# Patient Record
Sex: MALE | Race: WHITE | NOT HISPANIC OR LATINO | Employment: FULL TIME | URBAN - METROPOLITAN AREA
[De-identification: names, ages, dates, MRNs, and addresses within clinical notes are randomized per-mention and may not be internally consistent; named-entity substitution may affect disease eponyms.]

---

## 2021-04-05 ENCOUNTER — OFFICE VISIT (OUTPATIENT)
Dept: URGENT CARE | Facility: CLINIC | Age: 46
End: 2021-04-05
Payer: COMMERCIAL

## 2021-04-05 ENCOUNTER — APPOINTMENT (OUTPATIENT)
Dept: RADIOLOGY | Facility: IMAGING CENTER | Age: 46
End: 2021-04-05
Attending: PHYSICIAN ASSISTANT
Payer: COMMERCIAL

## 2021-04-05 VITALS
WEIGHT: 145 LBS | SYSTOLIC BLOOD PRESSURE: 122 MMHG | HEIGHT: 69 IN | RESPIRATION RATE: 16 BRPM | DIASTOLIC BLOOD PRESSURE: 88 MMHG | OXYGEN SATURATION: 98 % | HEART RATE: 73 BPM | BODY MASS INDEX: 21.48 KG/M2 | TEMPERATURE: 98.7 F

## 2021-04-05 DIAGNOSIS — S69.91XA INJURY OF FINGER OF RIGHT HAND, INITIAL ENCOUNTER: ICD-10-CM

## 2021-04-05 DIAGNOSIS — S61.219A LACERATION OF FINGER WITH INFECTION, INITIAL ENCOUNTER: ICD-10-CM

## 2021-04-05 DIAGNOSIS — L08.9 LACERATION OF FINGER WITH INFECTION, INITIAL ENCOUNTER: ICD-10-CM

## 2021-04-05 PROCEDURE — 99203 OFFICE O/P NEW LOW 30 MIN: CPT | Performed by: PHYSICIAN ASSISTANT

## 2021-04-05 RX ORDER — CEPHALEXIN 500 MG/1
500 TABLET ORAL 4 TIMES DAILY
Qty: 28 TABLET | Refills: 0 | Status: SHIPPED | OUTPATIENT
Start: 2021-04-05 | End: 2021-04-12

## 2021-04-05 ASSESSMENT — ENCOUNTER SYMPTOMS
FEVER: 0
HEADACHES: 0
SHORTNESS OF BREATH: 0
EYE DISCHARGE: 0
NAUSEA: 0
EYE REDNESS: 0
COUGH: 0
SORE THROAT: 0
VOMITING: 0

## 2021-04-05 NOTE — LETTER
April 5, 2021         Patient: Adam Wilson   YOB: 1975   Date of Visit: 4/5/2021           To Whom it May Concern:    Adam Wilson was seen in my clinic on 4/5/2021. Please excuse him from work today.    If you have any questions or concerns, please don't hesitate to call.        Sincerely,           Inga Mera P.A.-C.  Electronically Signed

## 2021-04-05 NOTE — PROGRESS NOTES
Subjective:      Adam Wilson is a 45 y.o. male who presents with Finger Injury (x4 days,  index/pinky fingers on (R) hand)        HPI  This is a new problem.   The patient presents to clinic complaining of an injury to his right hand, specifically the right thumb and the right fifth digit.  The patient states the injury occurred x4 days ago while he was working on his car.  The patient states he was holding a power tool in his right hand.  The patient states the wind blew the car door open, which caused the power tool to slip.  The patient states the grinding wire wheel cut his right thumb and right fifth digit.  The patient states he was wearing gloves at the time of the accident.  The patient states he cleaned the wounds following the injury and applied dressings.  The patient states he has been changing the dressings daily.  The patient states he recently applied liquid Band-Aid to the injury to his right thumb, and states his thumb injury is healing well. The patient is concerned the injury to the right fifth digit has become infected.  The patient reports associated redness, as well as a pus-like discharge/drainage.  The patient reports slight decreased range of motion of the right fifth digit secondary to swelling and stiffness.  He reports no numbness, tingling, weakness.  No fever.  The patient is taken ibuprofen for his current symptoms.  The patient states his tetanus vaccine is up-to-date, stating he believes he received a Tdap in 2017.  The patient is right-handed.    PMH:  has no past medical history on file.  MEDS: No current outpatient medications on file.  ALLERGIES: No Known Allergies  SURGHX: No past surgical history on file.  SOCHX:  reports that he has never smoked. He has never used smokeless tobacco. He reports current alcohol use. He reports that he does not use drugs.  FH: Family history was reviewed, no pertinent findings to report    Review of Systems   Constitutional:  "Negative for fever.   HENT: Negative for congestion, ear pain and sore throat.    Eyes: Negative for discharge and redness.   Respiratory: Negative for cough and shortness of breath.    Cardiovascular: Negative for chest pain and leg swelling.   Gastrointestinal: Negative for nausea and vomiting.   Musculoskeletal: Negative for joint pain.   Skin: Negative for rash.        + abrasion/laceration to right thumb and right 5th digit   Neurological: Negative for headaches.          Objective:     /88   Pulse 73   Temp 37.1 °C (98.7 °F) (Temporal)   Resp 16   Ht 1.753 m (5' 9\")   Wt 65.8 kg (145 lb)   SpO2 98%   BMI 21.41 kg/m²      Physical Exam  Constitutional:       General: He is not in acute distress.     Appearance: Normal appearance. He is well-developed. He is not ill-appearing.   HENT:      Head: Normocephalic and atraumatic.      Right Ear: External ear normal.      Left Ear: External ear normal.   Eyes:      Extraocular Movements: Extraocular movements intact.      Conjunctiva/sclera: Conjunctivae normal.   Cardiovascular:      Rate and Rhythm: Normal rate.   Pulmonary:      Effort: Pulmonary effort is normal.   Musculoskeletal:      Cervical back: Normal range of motion and neck supple.      Comments:   Right Hand:  Thumb-  Superficial abrasion/laceration to the distal aspect of the right thumb there is scabbing and liquid Band-Aid in place.  No tenderness to palpation.  No swelling.  No surrounding erythema.  No increased warmth.  No discharge/drainage.  No bony tenderness.  ROM intact -the patient demonstrates full active range of motion of the right thumb  Neurovascular intact distally  Capillary refill less than 2 seconds  Strength 5/5 - flexion/extension of the left thumb against resistance  5th Digit-  Superficial abrasion/laceration to the distal aspect of the right third digit overlying the DIP joint with scabbing in place and associated tenderness to palpation and trace surrounding " erythema.  Slight tenderness to the DIP joint of the right fifth digit.  No swelling.  No increased warmth.  No active discharge/drainage.   ROM intact - the patient demonstrates full active range of motion of the right fifth digit  Neurovascular intact distally  Capillary refill less than 2 seconds  Strength 5/5 -flexion/extension against resistance of the right fifth digit   Skin:     General: Skin is warm and dry.   Neurological:      Mental Status: He is alert and oriented to person, place, and time.            Progress:  Right Finger XR:   XRs reviewed by me.     COMPARISON: None     FINDINGS:  Bone mineralization is normal.  There is no evidence of fracture or dislocation.  There is no evidence of arthropathy.  Soft tissue swelling and laceration appear to be present at the level of the proximal end of the distal phalanx.  No metallic foreign   body is appreciated.     IMPRESSION:  No evidence of fracture or dislocation.    Wound Care:  Cleansed with the patient's abrasion/laceration to the right 5th digit with diluted Hibiclens.  Irrigated the wound with saline bullets.  Applied Polysporin and a nonstick dressing with Coban to the wound.  Educated the patient on proper wound care.  Advised patient to monitor for worsening signs of infection.      Assessment/Plan:          1. Injury of finger of right hand, initial encounter  - DX-FINGER(S) 2+ RIGHT; Future    2. Laceration of finger with infection, initial encounter  - Cephalexin 500 MG Tab; Take 1 tablet by mouth 4 times a day for 7 days.  Dispense: 28 tablet; Refill: 0    Differential diagnoses, supportive care, and indications for immediate follow-up discussed with patient.   Instructed to return to clinic or nearest emergency department for any change in condition, further concerns, or worsening of symptoms.    OTC Tylenol or Motrin for fever/discomfort.  Keep laceration clean and dry  Apply Polysporin/Neosporin to the laceration as needed  Cover  laceration while at work  Allow wound to be open to the air for at least a portion of the day  Avoid soaking the wound  Monitor for signs of infection  Follow-up with PCP  Return to clinic or go to the ED if symptoms worsen or fail to improve, or if patient should develop worsening/increasing/persistent pain/tenderness to the injured area, swelling, bruising, redness or warmth to the injured area, discharge/drainage from the wound, decreased range of motion, fever/chills, secondary signs of infection, and/or any concerning symptoms.    Discussed plan with the patient, and he agrees to the above.    I personally reviewed prior external notes and test results pertinent to today's visit.  I have independently reviewed and interpreted all diagnostics ordered during this urgent care visit.     Time spent evaluating this patient was at least 30 minutes and includes preparing for visit, obtaining history, exam and evaluation, ordering labs/tests/procedures/medications, independent interpretation, and counseling/education.    Please note that this dictation was created using voice recognition software. I have made every reasonable attempt to correct obvious errors, but I expect that there may be errors of grammar and possibly content that I did not discover before finalizing the note.     This note was electronically signed by Inga Mera PA-C

## 2022-01-25 ENCOUNTER — HOSPITAL ENCOUNTER (OUTPATIENT)
Dept: LAB | Facility: MEDICAL CENTER | Age: 47
End: 2022-01-25
Attending: OBSTETRICS & GYNECOLOGY
Payer: COMMERCIAL

## 2022-01-25 LAB
HBV SURFACE AB SERPL IA-ACNC: <3.5 MIU/ML (ref 0–10)
HBV SURFACE AG SER QL: NORMAL
HCV AB SER QL: NORMAL
HIV 1+2 AB+HIV1 P24 AG SERPL QL IA: NORMAL
TREPONEMA PALLIDUM IGG+IGM AB [PRESENCE] IN SERUM OR PLASMA BY IMMUNOASSAY: NORMAL

## 2022-01-25 PROCEDURE — 86803 HEPATITIS C AB TEST: CPT

## 2022-01-25 PROCEDURE — 87340 HEPATITIS B SURFACE AG IA: CPT

## 2022-01-25 PROCEDURE — 36415 COLL VENOUS BLD VENIPUNCTURE: CPT

## 2022-01-25 PROCEDURE — 86706 HEP B SURFACE ANTIBODY: CPT

## 2022-01-25 PROCEDURE — 87491 CHLMYD TRACH DNA AMP PROBE: CPT

## 2022-01-25 PROCEDURE — 87389 HIV-1 AG W/HIV-1&-2 AB AG IA: CPT

## 2022-01-25 PROCEDURE — 87591 N.GONORRHOEAE DNA AMP PROB: CPT

## 2022-01-25 PROCEDURE — 86780 TREPONEMA PALLIDUM: CPT

## 2022-01-26 LAB
C TRACH DNA SPEC QL NAA+PROBE: NEGATIVE
N GONORRHOEA DNA SPEC QL NAA+PROBE: NEGATIVE
SPECIMEN SOURCE: NORMAL

## 2022-03-13 ENCOUNTER — OFFICE VISIT (OUTPATIENT)
Dept: URGENT CARE | Facility: CLINIC | Age: 47
End: 2022-03-13
Payer: COMMERCIAL

## 2022-03-13 VITALS
TEMPERATURE: 98.6 F | DIASTOLIC BLOOD PRESSURE: 68 MMHG | SYSTOLIC BLOOD PRESSURE: 120 MMHG | OXYGEN SATURATION: 95 % | RESPIRATION RATE: 14 BRPM | HEIGHT: 69 IN | WEIGHT: 156.8 LBS | HEART RATE: 86 BPM | BODY MASS INDEX: 23.22 KG/M2

## 2022-03-13 DIAGNOSIS — A60.00 GENITAL HERPES SIMPLEX, UNSPECIFIED SITE: ICD-10-CM

## 2022-03-13 PROCEDURE — 99204 OFFICE O/P NEW MOD 45 MIN: CPT | Performed by: PHYSICIAN ASSISTANT

## 2022-03-13 RX ORDER — VALACYCLOVIR HYDROCHLORIDE 1 G/1
1000 TABLET, FILM COATED ORAL 2 TIMES DAILY
Qty: 20 TABLET | Refills: 0 | Status: SHIPPED | OUTPATIENT
Start: 2022-03-13 | End: 2022-03-23

## 2022-03-13 ASSESSMENT — ENCOUNTER SYMPTOMS
CHILLS: 0
TINGLING: 0
MYALGIAS: 0
NAUSEA: 0
VOMITING: 0
FOCAL WEAKNESS: 0
SENSORY CHANGE: 0
FEVER: 0

## 2022-03-14 NOTE — PROGRESS NOTES
"Subjective     Adam Wilson is a 46 y.o. male who presents with Other (Treatment for herpes )            The patient is here requesting treatment for herpes. The patient reports he has a herpes outbreak on his scrotal area and anal region that started about 1.5 weeks ago. He has history of herpes. His last outbreak was in 2018. He typically treats his outbreaks at home but he states the rash has become very uncomfortable. He denies fever or chills. No nausea, vomiting or body aches. He denies drainage from the sores.      No past medical history on file.    No past surgical history on file.    No family history on file.    No Known Allergies      Medications, Allergies, and current problem list reviewed today in Epic      Review of Systems   Constitutional: Negative for chills, fever and malaise/fatigue.   Gastrointestinal: Negative for nausea and vomiting.   Musculoskeletal: Negative for myalgias.   Skin: Positive for rash.   Neurological: Negative for tingling, sensory change and focal weakness.         All other systems reviewed and are negative.         Objective     /68 (BP Location: Left arm, Patient Position: Sitting, BP Cuff Size: Adult)   Pulse 86   Temp 37 °C (98.6 °F)   Resp 14   Ht 1.753 m (5' 9\")   Wt 71.1 kg (156 lb 12.8 oz)   SpO2 95%   BMI 23.16 kg/m²      Physical Exam  Constitutional:       General: He is not in acute distress.     Appearance: He is not ill-appearing.   HENT:      Head: Normocephalic and atraumatic.   Eyes:      Conjunctiva/sclera: Conjunctivae normal.   Cardiovascular:      Rate and Rhythm: Normal rate and regular rhythm.   Pulmonary:      Effort: Pulmonary effort is normal. No respiratory distress.   Genitourinary:     Comments: deferred  Skin:     General: Skin is warm and dry.   Neurological:      General: No focal deficit present.      Mental Status: He is alert and oriented to person, place, and time.   Psychiatric:         Mood and Affect: Mood " normal.         Behavior: Behavior normal.         Thought Content: Thought content normal.         Judgment: Judgment normal.                             Assessment & Plan        1. Genital herpes simplex, unspecified site    Patient refused to let me examine his rash.  He has a history of recurrent outbreaks and states his symptoms are exactly the same.   Discussed that he needs to monitor for secondary infection.   - valacyclovir (VALTREX) 1 GM Tab; Take 1 Tablet by mouth 2 times a day for 10 days.  Dispense: 20 Tablet; Refill: 0         Differential diagnoses, Supportive care, and indications for immediate follow-up discussed with patient.   Pathogenesis of diagnosis discussed including typical length and natural progression.   Instructed to return to clinic or nearest emergency department for any change in condition, further concerns, or worsening of symptoms.    The patient demonstrated a good understanding and agreed with the treatment plan.    Ronna Lassiter P.A.-C.

## 2022-03-17 ENCOUNTER — OFFICE VISIT (OUTPATIENT)
Dept: URGENT CARE | Facility: CLINIC | Age: 47
End: 2022-03-17
Payer: COMMERCIAL

## 2022-03-17 ENCOUNTER — TELEPHONE (OUTPATIENT)
Dept: URGENT CARE | Facility: CLINIC | Age: 47
End: 2022-03-17

## 2022-03-17 VITALS
BODY MASS INDEX: 23.25 KG/M2 | TEMPERATURE: 98.8 F | WEIGHT: 157 LBS | HEIGHT: 69 IN | OXYGEN SATURATION: 98 % | RESPIRATION RATE: 12 BRPM | SYSTOLIC BLOOD PRESSURE: 138 MMHG | DIASTOLIC BLOOD PRESSURE: 84 MMHG | HEART RATE: 89 BPM

## 2022-03-17 DIAGNOSIS — B37.49 CANDIDIASIS OF PERINEUM: ICD-10-CM

## 2022-03-17 DIAGNOSIS — N48.1 BALANITIS: ICD-10-CM

## 2022-03-17 DIAGNOSIS — L30.9 ECZEMA OF BOTH HANDS: ICD-10-CM

## 2022-03-17 PROCEDURE — 99213 OFFICE O/P EST LOW 20 MIN: CPT | Performed by: NURSE PRACTITIONER

## 2022-03-17 RX ORDER — TRIAMCINOLONE ACETONIDE 1 MG/G
1 CREAM TOPICAL 2 TIMES DAILY
Qty: 15 G | Refills: 0 | Status: SHIPPED | OUTPATIENT
Start: 2022-03-17 | End: 2022-03-31

## 2022-03-17 RX ORDER — NYSTATIN 100000 [USP'U]/G
1 POWDER TOPICAL 3 TIMES DAILY
Qty: 30 G | Refills: 0 | Status: SHIPPED | OUTPATIENT
Start: 2022-03-17

## 2022-03-17 ASSESSMENT — ENCOUNTER SYMPTOMS
CHILLS: 0
FEVER: 0

## 2022-03-17 NOTE — LETTER
March 17, 2022         Patient: Adam Wilson   YOB: 1975   Date of Visit: 3/17/2022           To Whom it May Concern:    Adam Wilson was seen in my clinic on 3/17/2022. He may return to work on 3/23/2022.    If you have any questions or concerns, please don't hesitate to call.        Sincerely,           LULI Ervin.  Electronically Signed

## 2022-03-18 NOTE — PROGRESS NOTES
Subjective:     Adam Wilson is a 46 y.o. male who presents for Herpes Zoster (Started in January/has spread/painful/has discharge/scabbing)      Presents for worsening rash to pubis, genitalia, and rectal area. Believes symptoms started from a HSV outbreak in January. Started by the rectum. Reports discharge from lesions was clear fluid, now orange. Pain with walking and sitting due to swelling. No itch to groin area. Hx of HSV, stating this is not the first occurrence. Is in a monogamous relationship, no concerns for a new STI.  Is in need of a PCP.  Missed work today, and needs a work note.    Also has a hx of eczema, rash to dorsal hands is itchy.     Rash  This is a recurrent problem. The current episode started more than 1 month ago. The problem has been gradually worsening since onset. The affected locations include the genitalia and groin. The rash is characterized by burning, pain, redness, swelling and draining. He was exposed to nothing. Pertinent negatives include no fever. His past medical history is significant for eczema.       History reviewed. No pertinent past medical history.    History reviewed. No pertinent surgical history.    Social History     Socioeconomic History   • Marital status:      Spouse name: Not on file   • Number of children: Not on file   • Years of education: Not on file   • Highest education level: Not on file   Occupational History   • Not on file   Tobacco Use   • Smoking status: Never Smoker   • Smokeless tobacco: Never Used   Vaping Use   • Vaping Use: Never used   Substance and Sexual Activity   • Alcohol use: Yes     Comment: occ   • Drug use: Never   • Sexual activity: Not on file   Other Topics Concern   • Not on file   Social History Narrative   • Not on file     Social Determinants of Health     Financial Resource Strain: Not on file   Food Insecurity: Not on file   Transportation Needs: Not on file   Physical Activity: Not on file   Stress: Not  "on file   Social Connections: Not on file   Intimate Partner Violence: Not on file   Housing Stability: Not on file        History reviewed. No pertinent family history.     No Known Allergies    Review of Systems   Constitutional: Negative for chills and fever.   Skin: Positive for itching and rash.   All other systems reviewed and are negative.       Objective:   /84 (BP Location: Right arm, Patient Position: Standing, BP Cuff Size: Adult)   Pulse 89   Temp 37.1 °C (98.8 °F) (Temporal)   Resp 12   Ht 1.753 m (5' 9\")   Wt 71.2 kg (157 lb)   SpO2 98%   BMI 23.18 kg/m²     Physical Exam  Genitourinary:     Penis: Tenderness and swelling present.       Comments: Hyperpigmented rash is moist with bordering satellite lesions, and excoriations to gluteal folds, mid buttocks, penis, and scrotum. Scrotal and glans swelling. No skin sloughing, Negative Nikolsky's sign. No vesicular lesions or pustules.   Skin:     General: Skin is warm and dry.      Findings: Rash present. Rash is papular.      Comments: Dry papular rash to bilateral dorsal hands.          Assessment/Plan:   1. Balanitis  - Referral to establish with [facility] PCP  - nystatin (MYCOSTATIN) powder; Apply 1 g topically 3 times a day.  Dispense: 30 g; Refill: 0  - Referral to Dermatology  - Clotrimazole 1 % Ointment; Apply 1 Application topically 2 times a day for 14 days.  Dispense: 56.7 g; Refill: 0    2. Candidiasis of perineum  - Referral to establish with [facility] PCP  - nystatin (MYCOSTATIN) powder; Apply 1 g topically 3 times a day.  Dispense: 30 g; Refill: 0  - Referral to Dermatology  - Clotrimazole 1 % Ointment; Apply 1 Application topically 2 times a day for 14 days.  Dispense: 56.7 g; Refill: 0    3. Eczema of both hands  - Referral to establish with [facility] PCP  - Referral to Dermatology  - triamcinolone acetonide (KENALOG) 0.1 % Cream; Apply 1 Application topically 2 times a day for 14 days.  Dispense: 15 g; Refill: 0  -Avoid scratching to " cause any skin breakdown.  -Clean area with gentle soap and water, and throughly dry the area. Allow air circulation by keeping area open to air when possible, and wear breathable clothing (cotton).      Follow up with PCP. Follow up for worsening symptoms, signs of infection, persistent or worsening pain, or any other concerns. Follow up emergently for fever, skin sloughing, or increased testicular or penile swelling.    Can use nystatin powder for skin folds. Area is moist with satellite lesions, consistent with a candidal infection. Discussed candidal vs tinea infection, treatment covers both. Differential to include balanitis that has spread to surrounding tissues. Afebrile, stable vitals. No indication of secondary bacterial skin infection at this time, advised follow up for persistent or worsening symtpms. Work note provided for the next 3 days off of work to allow for healing, after Saturday he will be off until Wednesday. Reviewed notes from previous visit.     Differential diagnosis, natural history, supportive care, and indications for immediate follow-up discussed.

## 2022-03-18 NOTE — TELEPHONE ENCOUNTER
Called and LVM for patient to let him know that provider e-scribed over three medications to the [facility] per his request.   Asked patient to call back if he had any questions, attempted to contact patient thru wife's phone but VM box not set up.

## 2022-03-18 NOTE — PATIENT INSTRUCTIONS
-Avoid scratching to cause any skin breakdown.  -Clean area with gentle soap and water, and throughly dry the area. Allow air circulation by keeping area open to air when possible, and wear breathable clothing (cotton).      Follow up with PCP. Follow up for worsening symptoms, signs of infection, persistent or worsening pain, or any other concerns. Follow up emergently for fever, skin sloughing, or increased testicular or penile swelling.      Skin Yeast Infection    A skin yeast infection is a condition in which there is an overgrowth of yeast (candida) that normally lives on the skin. This condition usually occurs in areas of the skin that are constantly warm and moist, such as the armpits or the groin.  What are the causes?  This condition is caused by a change in the normal balance of the yeast and bacteria that live on the skin.  What increases the risk?  You are more likely to develop this condition if you:  · Are obese.  · Are pregnant.  · Take birth control pills.  · Have diabetes.  · Take antibiotic medicines.  · Take steroid medicines.  · Are malnourished.  · Have a weak body defense system (immune system).  · Are 65 years of age or older.  · Wear tight clothing.  What are the signs or symptoms?  The most common symptom of this condition is itchiness in the affected area. Other symptoms include:  · Red, swollen area of the skin.  · Bumps on the skin.  How is this diagnosed?  · This condition is diagnosed with a medical history and physical exam.  · Your health care provider may check for yeast by taking light scrapings of the skin to be viewed under a microscope.  How is this treated?  This condition is treated with medicine. Medicines may be prescribed or be available over the counter. The medicines may be:  · Taken by mouth (orally).  · Applied as a cream or powder to your skin.  Follow these instructions at home:    · Take or apply over-the-counter and prescription medicines only as told by your health  care provider.  · Maintain a healthy weight. If you need help losing weight, talk with your health care provider.  · Keep your skin clean and dry.  · If you have diabetes, keep your blood sugar under control.  · Keep all follow-up visits as told by your health care provider. This is important.  Contact a health care provider if:  · Your symptoms go away and then return.  · Your symptoms do not get better with treatment.  · Your symptoms get worse.  · Your rash spreads.  · You have a fever or chills.  · You have new symptoms.  · You have new warmth or redness of your skin.  Summary  · A skin yeast infection is a condition in which there is an overgrowth of yeast (candida) that normally lives on the skin. This condition is caused by a change in the normal balance of the yeast and bacteria that live on the skin.  · Take or apply over-the-counter and prescription medicines only as told by your health care provider.  · Keep your skin clean and dry.  · Contact a health care provider if your symptoms do not get better with treatment.  This information is not intended to replace advice given to you by your health care provider. Make sure you discuss any questions you have with your health care provider.  Document Released: 09/04/2012 Document Revised: 05/07/2019 Document Reviewed: 05/07/2019  [employer] Patient Education © 2020 [employer]      Balanitis    Balanitis is swelling and irritation (inflammation) of the head of the penis (glans penis). The condition may also cause inflammation of the skin around the glans penis (foreskin) in men who have not been circumcised. It may develop because of an infection or another medical condition.  Balanitis occurs most often among men who have not had their foreskin removed (uncircumcised men). Balanitis sometimes causes scarring of the penis or foreskin, which can require surgery. Untreated balanitis can increase the risk of penile cancer.  What are the causes?  Common causes of  this condition include:  · Poor personal hygiene, especially in uncircumcised men. Not cleaning the glans penis and foreskin well can result in buildup of bacteria, viruses, and yeast, which can lead to infection and inflammation.  · Irritation and lack of air flow due to fluid (smegma) that can build up on the glans penis.  Other causes include:  · Chemical irritation from products such as soaps or shower gels (especially those that have fragrance), condoms, personal lubricants, petroleum jelly, spermicides, or fabric softeners.  · Skin conditions, such as eczema, dermatitis, and psoriasis.  · Allergies to medicines, such as tetracycline and sulfa drugs.  · Certain medical conditions, including liver cirrhosis, congestive heart failure, diabetes, and kidney disease.  · Infections, such as candidiasis, HPV (human papillomavirus), herpes simplex, gonorrhea, and syphilis.  · Severe obesity.  What increases the risk?  The following factors may make you more likely to develop this condition:  · Having diabetes. This is the most common risk factor.  · Having a tight foreskin that is difficult to pull back (retract) past the glans.  · Having sexual intercourse without using a condom.  What are the signs or symptoms?  Symptoms of this condition include:  · Discharge from under the foreskin.  · A bad smell.  · Pain or difficulty retracting the foreskin.  · Tenderness, redness, and swelling of the glans.  · A rash or sores on the glans or foreskin.  · Itchiness.  · Inability to get an erection due to pain.  · Difficulty urinating.  · Scarring of the penis or foreskin, in some cases.  How is this diagnosed?  This condition may be diagnosed based on:  · A physical exam.  · Testing a swab of discharge to check for bacterial or fungal infection.  · Blood tests:  ? To check for viruses that can cause balanitis.  ? To check your blood sugar (glucose) level. High blood glucose could be a sign of diabetes, which can cause  balanitis.  How is this treated?  Treatment for balanitis depends on the cause. Treatment may include:  · Improving personal hygiene. Your health care provider may recommend sitting in a bath of warm water that is deep enough to cover your hips and buttocks (sitz bath).  · Medicines such as:  ? Creams or ointments to reduce swelling (steroids) or to treat an infection.  ? Antibiotic medicine.  ? Antifungal medicine.  · Surgery to remove or cut the foreskin (circumcision). This may be done if you have scarring on the foreskin that makes it difficult to retract.  · Controlling other medical problems that may be causing your condition or making it worse.  Follow these instructions at home:  · Do not have sex until the condition clears up, or until your health care provider approves.  · Keep your penis clean and dry. Take sitz baths as recommended by your health care provider.  · Avoid products that irritate your skin or make symptoms worse, such as soaps and shower gels that have fragrance.  · Take over-the-counter and prescription medicines only as told by your health care provider.  ? If you were prescribed an antibiotic medicine or a cream or ointment, use it as told by your health care provider. Do not stop using your medicine, cream, or ointment even if you start to feel better.  ? Do not drive or use heavy machinery while taking prescription pain medicine.  Contact a health care provider if:  · Your symptoms get worse or do not improve with home care.  · You develop chills or a fever.  · You have trouble urinating.  · You cannot retract your foreskin.  Get help right away if:  · You develop severe pain.  · You are unable to urinate.  Summary  · Balanitis is inflammation of the head of the penis (glans penis) caused by irritation or infection.  · Balanitis causes pain, redness, and swelling of the glans penis.  · This condition is most common among uncircumcised men who do not keep their glans penis clean and in  men who have diabetes.  · Treatment may include creams or ointments.  · Good hygiene is important for prevention. This includes pulling back the foreskin when washing your penis.  This information is not intended to replace advice given to you by your health care provider. Make sure you discuss any questions you have with your health care provider.  Document Released: 05/06/2010 Document Revised: 11/30/2018 Document Reviewed: 11/06/2017  [employer] Patient Education © 2020 [employer]      Jock Itch  Jock itch (tinea cruris) is an infection of the skin in the groin area. It is caused by a fungus, which is a type of germ that lives in dark, damp places. Jock itch causes an itchy rash in the groin and upper thigh area. It usually goes away in 2-3 weeks with treatment.  What are the causes?  The fungus that causes jock itch may be spread by:  Touching a fungus infection elsewhere on your body, such as athlete's foot, and then touching your groin area.  Sharing towels or clothing, such as socks or shoes, with someone who has a fungal infection.  What increases the risk?  Jock itch is most common in men and adolescent boys. You are also more likely to develop the condition if you:  Are in a hot, humid climate.  Wear tight-fitting clothing or wet bathing suits for long periods of time.  Play sports.  Are overweight.  Have diabetes.  Have a weakened immune system.  Sweat a lot.  What are the signs or symptoms?  Symptoms of jock itch may include:  A red, pink, or brown rash in the groin area. Blisters may be present. The rash may spread to the thighs, anus, and buttocks.  Dry and scaly skin on or around the rash.  Itchiness.  How is this diagnosed?  In most cases, your health care provider can make the diagnosis by looking at your rash. In some cases, a sample of infected skin may be scraped off. This sample may be examined under a microscope (biopsy) or by trying to grow the fungus from the sample (culture).  How is this  treated?  Treatment for this condition may include:  Antifungal medicine to kill the fungus. This may be a skin cream, ointment, or powder, or it may be a medicine that you take by mouth.  Skin cream or ointment to reduce itching.  Lifestyle changes, such as wearing looser clothing and caring for your skin.  Follow these instructions at home:  Skin care  Apply skin creams, ointments, or powders exactly as told by your health care provider.  Wear loose-fitting clothing that does not rub against your groin area. Men should wear boxer shorts or loose-fitting underwear.  Keep your groin area clean and dry.  Change your underwear every day.  Change out of wet bathing suits as soon as possible.  After bathing, use a separate towel to dry your groin area thoroughly and gently. Using a separate towel will help prevent spreading the infection to other areas of your body.  Avoid hot baths and showers. Hot water can make itching worse.  Do not scratch the affected area.  General instructions  Take and apply over-the-counter and prescription medicines only as told by your health care provider.  Do not share towels, clothing, or personal items with other people.  Wash your hands often with soap and water, especially after touching your groin area. If soap and water are not available, use alcohol-based hand .  Contact a health care provider if:  Your rash:  Gets worse or does not get better after 2 weeks of treatment.  Spreads.  Returns after treatment is finished.  You have any of the following:  A fever.  New or worsening redness, swelling, or pain around your rash.  Fluid, blood, or pus coming from your rash.  Summary  Jock itch (tinea cruris) is a fungal infection of the skin in the groin area.  The fungus can be spread by sharing clothing or by touching a fungus infection elsewhere on your body and then touching your groin area.  Treatment may include antifungal medicine and lifestyle changes, such as keeping the  area clean and dry.  This information is not intended to replace advice given to you by your health care provider. Make sure you discuss any questions you have with your health care provider.  Document Released: 12/08/2003 Document Revised: 11/30/2018 Document Reviewed: 11/28/2018  [employer] Patient Education © 2020 [employer]